# Patient Record
Sex: FEMALE | Race: WHITE | NOT HISPANIC OR LATINO | Employment: FULL TIME | URBAN - METROPOLITAN AREA
[De-identification: names, ages, dates, MRNs, and addresses within clinical notes are randomized per-mention and may not be internally consistent; named-entity substitution may affect disease eponyms.]

---

## 2022-08-12 ENCOUNTER — OFFICE VISIT (OUTPATIENT)
Dept: URGENT CARE | Facility: CLINIC | Age: 22
End: 2022-08-12
Payer: COMMERCIAL

## 2022-08-12 VITALS
DIASTOLIC BLOOD PRESSURE: 93 MMHG | OXYGEN SATURATION: 98 % | RESPIRATION RATE: 16 BRPM | HEART RATE: 95 BPM | SYSTOLIC BLOOD PRESSURE: 135 MMHG

## 2022-08-12 DIAGNOSIS — S39.012A STRAIN OF LUMBAR REGION, INITIAL ENCOUNTER: ICD-10-CM

## 2022-08-12 DIAGNOSIS — Z02.6 ENCOUNTER RELATED TO WORKER'S COMPENSATION CLAIM: Primary | ICD-10-CM

## 2022-08-12 PROCEDURE — 72100 X-RAY EXAM L-S SPINE 2/3 VWS: CPT | Mod: S$GLB,,, | Performed by: RADIOLOGY

## 2022-08-12 PROCEDURE — 99203 OFFICE O/P NEW LOW 30 MIN: CPT | Mod: S$GLB,,, | Performed by: EMERGENCY MEDICINE

## 2022-08-12 PROCEDURE — 99203 PR OFFICE/OUTPT VISIT, NEW, LEVL III, 30-44 MIN: ICD-10-PCS | Mod: S$GLB,,, | Performed by: EMERGENCY MEDICINE

## 2022-08-12 PROCEDURE — 72100 XR LUMBAR SPINE 2 OR 3 VIEWS: ICD-10-PCS | Mod: S$GLB,,, | Performed by: RADIOLOGY

## 2022-08-12 NOTE — LETTER
[facility]  [address], [address]  [place] [address]  Phone: [phone]  Fax: [phone]  [employer]: [id number]    Pt Name: Re Patel Date: 07/27/2022   Employee ID:  Date of First Treatment: 08/12/2022   Company: [employer]       Appointment Time: 01:50 PM Arrived: 14:09   Provider: Luigi Burnham MD Time Out:14:50     Office Treatment:   1. Encounter related to worker's compensation claim    2. Strain of lumbar region, initial encounter             Physical therapy referral ordered     No lifting over 15 lb.  No other specific restrictions.         Return Appointment: After PT

## 2022-08-12 NOTE — PROGRESS NOTES
Subjective:       Patient ID: Re Flor is a 22 y.o. female.    Chief Complaint: Back Pain    WC Initial visit.  Pt works for [employer]  Pt states that around July 27th (approx)  , she picked up a case of water and felt a sharp pain run from lower back up to her mid back.  Hurt for 2 days, got better then grad got worse again but has improved over the last 2 days.  Pain lower back and across to both hips and down side of hips. Pain is worse with sitting or getting out of bed, better with walking/moving             Pain 6/10    Back Pain  This is a new problem. The current episode started 1 to 4 weeks ago. The problem occurs intermittently. The pain is present in the lumbar spine. Radiates to: right and left hips. The pain is at a severity of 6/10. The pain is moderate. The symptoms are aggravated by lying down and sitting. Stiffness is present in the morning. Pertinent negatives include no leg pain or numbness. She has tried ice (epson salts) for the symptoms. The treatment provided moderate relief.       Musculoskeletal: Positive for back pain.   Neurological: Negative for numbness.        Objective:      Physical Exam  Vitals and nursing note reviewed.   Cardiovascular:      Rate and Rhythm: Normal rate and regular rhythm.      Pulses: Normal pulses.   Pulmonary:      Effort: Pulmonary effort is normal. No respiratory distress.      Breath sounds: Normal breath sounds. No wheezing.   Abdominal:      General: There is no distension.      Tenderness: There is no abdominal tenderness. There is no right CVA tenderness, left CVA tenderness, guarding or rebound.   Musculoskeletal:      Cervical back: Normal range of motion and neck supple.      Comments: Musculoskeletal exam reveals normal bony thorax.  No tenderness at the midline of the lumbar thoracic spine.  There is minimal tenderness to musculature in the lower lumbar paraspinous area.  Negative straight leg raise.  Pain is reproduced with twisting of the  lumbar spine.  Bony pelvis is nontender.  Extremities all have full range of motion and are nontender.   Skin:     General: Skin is warm and dry.   Neurological:      General: No focal deficit present.      Mental Status: She is oriented to person, place, and time.      Cranial Nerves: No cranial nerve deficit.      Sensory: No sensory deficit.      Motor: No weakness.      Coordination: Coordination normal.      Gait: Gait normal.      Deep Tendon Reflexes: Reflexes normal.   Psychiatric:         Mood and Affect: Mood normal.         Behavior: Behavior normal.         Thought Content: Thought content normal.         Judgment: Judgment normal.           22-year-old says she injured her back at work when she lifted heavy cases of water.  She felt a sudden pain in her lower lumbar paraspinous area this occurred more than 2 weeks ago.  She says the pain has not improved.  She has no loss of distal motor sensory function.  She has no signs of cord compression.  X-ray is unremarkable.  Will refer to Physical therapy and recommend ibuprofen and avoiding very heavy lifting.     Assessment:       1. Encounter related to worker's compensation claim    2. Strain of lumbar region, initial encounter        Plan:                   No follow-ups on file.